# Patient Record
Sex: MALE | Race: BLACK OR AFRICAN AMERICAN | NOT HISPANIC OR LATINO | Employment: PART TIME | URBAN - METROPOLITAN AREA
[De-identification: names, ages, dates, MRNs, and addresses within clinical notes are randomized per-mention and may not be internally consistent; named-entity substitution may affect disease eponyms.]

---

## 2023-09-26 ENCOUNTER — APPOINTMENT (OUTPATIENT)
Dept: RADIOLOGY | Facility: HOSPITAL | Age: 63
End: 2023-09-26
Attending: EMERGENCY MEDICINE
Payer: OTHER MISCELLANEOUS

## 2023-09-26 ENCOUNTER — HOSPITAL ENCOUNTER (EMERGENCY)
Facility: HOSPITAL | Age: 63
Discharge: HOME | End: 2023-09-26
Attending: EMERGENCY MEDICINE | Admitting: EMERGENCY MEDICINE
Payer: OTHER MISCELLANEOUS

## 2023-09-26 VITALS
SYSTOLIC BLOOD PRESSURE: 135 MMHG | WEIGHT: 159.17 LBS | DIASTOLIC BLOOD PRESSURE: 74 MMHG | BODY MASS INDEX: 24.98 KG/M2 | OXYGEN SATURATION: 99 % | TEMPERATURE: 98 F | RESPIRATION RATE: 18 BRPM | HEART RATE: 80 BPM | HEIGHT: 67 IN

## 2023-09-26 DIAGNOSIS — S53.401A SPRAIN OF RIGHT UPPER ARM, INITIAL ENCOUNTER: ICD-10-CM

## 2023-09-26 PROBLEM — M17.12 PRIMARY OSTEOARTHRITIS OF LEFT KNEE: Status: ACTIVE | Noted: 2019-10-03

## 2023-09-26 PROBLEM — N40.1 BPH WITH OBSTRUCTION/LOWER URINARY TRACT SYMPTOMS: Status: ACTIVE | Noted: 2023-07-10

## 2023-09-26 PROBLEM — N13.8 BPH WITH OBSTRUCTION/LOWER URINARY TRACT SYMPTOMS: Status: ACTIVE | Noted: 2023-07-10

## 2023-09-26 PROBLEM — R33.9 RETENTION OF URINE: Status: ACTIVE | Noted: 2021-07-21

## 2023-09-26 PROCEDURE — 73090 X-RAY EXAM OF FOREARM: CPT | Mod: RT

## 2023-09-26 PROCEDURE — 29125 APPL SHORT ARM SPLINT STATIC: CPT | Mod: RT

## 2023-09-26 PROCEDURE — 99283 EMERGENCY DEPT VISIT LOW MDM: CPT

## 2023-09-26 RX ORDER — HYDROCODONE BITARTRATE AND ACETAMINOPHEN 5; 325 MG/1; MG/1
1 TABLET ORAL EVERY 6 HOURS PRN
Qty: 10 TABLET | Refills: 0 | Status: SHIPPED | OUTPATIENT
Start: 2023-09-26 | End: 2023-09-29

## 2023-09-26 ASSESSMENT — ACTIVITIES OF DAILY LIVING (ADL): ADLS_ACUITY_SCORE: 35

## 2023-09-26 NOTE — DISCHARGE INSTRUCTIONS
Shashi is most consistent with a sprain or injury to the muscles and tendons of your forearm.  Recommend rest ice and light duty.  Utilize brace to immobilize the area this will help it heal.  Expect improvement to your symptoms over the next 5 to 7 days.  If you have escalation your symptoms return to emergency department for repeat assessment of your symptoms are persistent and not resolving recommend follow-up with orthopedics on outpatient basis.  Pain medication as prescribed.

## 2023-09-26 NOTE — ED PROVIDER NOTES
"EMERGENCY DEPARTMENT ENCOUNTER      NAME: Javed Bender  AGE: 62 year old male  YOB: 1960  MRN: [id number]  EVALUATION DATE & TIME: No admission date for patient encounter.    PCP: No primary care provider on file.    ED PROVIDER: Ricardo Oneal M.D.       Chief Complaint   Patient presents with    Arm Injury     Right     FINAL IMPRESSION:  1. Sprain of right upper arm, initial encounter      ED COURSE & MEDICAL DECISION MAKING:    Pertinent Labs & Imaging studies reviewed. (See chart for details)  62 year old male presents to the Emergency Department for evaluation of right forearm injury.  Patient works at [employer].  He was lifting a \"70 pound box\".  When he had sudden onset of pain to the volar aspect of his mid right forearm.  Reports pain when he flexes and extends his wrist.  Never had this happen before.  On examination he had tenderness mostly with movement of the wrist.  He did not have point tenderness to the wrist bones or the forearm bones.  His elbow seem to be fine and movement at the elbow did not seem to elicit that same discomfort.  The overall examination was most consistent with a muscular or tendon strain to the right forearm.  I did perform x-ray to evaluate for possible fracture this was negative.  We applied a removable Velcro forearm splint that immobilized the wrist and patient reported that that helped with his symptomatology.  Overall plan of care for discharge rest ice immobilization follow-up if symptoms persist to return if symptoms worsen.  Note that the history discussion of the test results and care plan was conducted utilizing a .         2:31 AM I met with the patient, obtained history, performed an initial exam, and discussed options and plan for diagnostics and treatment here in the ED.    At the conclusion of the encounter I discussed the results of all of the tests and the disposition. The questions were answered. The patient or family " "acknowledged understanding and was agreeable with the care plan.     Medical Decision Making    History:  Supplemental history from: Friend  External Record(s) reviewed: Documented in chart, if applicable.    Work Up:  Chart documentation includes differential considered and any EKGs or imaging independently interpreted by provider, where specified.  In additional to work up documented, I considered the following work up: Documented in chart, if applicable.    External consultation:  Discussion of management with another provider: Documented in chart, if applicable    Complicating factors:  Care impacted by chronic illness: Diabetes  Care affected by social determinants of health: N/A    Disposition considerations: Discharge. I prescribed additional prescription strength medication(s) as charted. See documentation for any additional details.        MEDICATIONS GIVEN IN THE EMERGENCY:  Medications - No data to display    NEW PRESCRIPTIONS STARTED AT TODAY'S ER VISIT  New Prescriptions    HYDROCODONE-ACETAMINOPHEN (NORCO) 5-325 MG TABLET    Take 1 tablet by mouth every 6 hours as needed for severe pain          =================================================================    HPI    Patient information was obtained from: patient     Use of : Yes (video call) Language: NACHO Bender is a 62 year old male with a pertinent history of diabetes who presents to this ED via walk-in for evaluation of arm pain.    The patient presents with right forearm pain after he was lifting a heavy box at his job ([employer] He states that this was a ~70 lb wooden crate that may have had tires in it. He did not feel or hear a \"pop\" but had pain immediately afterwards. He continued to work afterwards and \"walk it off\" but after talking with his boss he iced and wrapped it. Now, he continues to have pain over his right forearm and has pain/difficulty closing his right hand. He also notices some swelling and a feeling " "of \"heaviness\" in his forearm. He does not have pain in his upper arm. He would like to be prescribed pain medications but does not need anything right now. He would like a wrist brace so that he can work tomorrow. He is going to talk to his boss to see if he can perform light work duties. He works tomorrow but has Thursday and Friday off. He denies any other complaints at this time.       REVIEW OF SYSTEMS   Review of Systems   Musculoskeletal:         Pain to right forearm   All other systems reviewed and are negative.       PAST MEDICAL HISTORY:  History reviewed. No pertinent past medical history.    PAST SURGICAL HISTORY:  History reviewed. No pertinent surgical history.        CURRENT MEDICATIONS:    HYDROcodone-acetaminophen (NORCO) 5-325 MG tablet        ALLERGIES:  No Known Allergies    FAMILY HISTORY:  History reviewed. No pertinent family history.    SOCIAL HISTORY:        VITALS:  BP (!) 147/78   Pulse 83   Temp 98  F (36.7  C) (Oral)   Resp 18   Ht 1.702 m (5' 7\")   Wt 72.2 kg (159 lb 2.8 oz)   SpO2 98%   BMI 24.93 kg/m      PHYSICAL EXAM    PHYSICAL EXAM    VITAL SIGNS: BP (!) 147/78   Pulse 83   Temp 98  F (36.7  C) (Oral)   Resp 18   Ht 1.702 m (5' 7\")   Wt 72.2 kg (159 lb 2.8 oz)   SpO2 98%   BMI 24.93 kg/m    Constitutional:  Well developed, well nourished  EYES: Conjunctivae clear, no discharge  HENT: Atraumatic, normocephalic, bilateral external ears normal.  Oropharynx moist. Nose normal.   Neck: Normal ROM , Supple   Respiratory:  No respiratory distress, normal nonlabored respirations.   Cardiovascular:  Distal perfusion appears intact  Musculoskeletal: Tenderness to movement of the wrist flexion extension localizing the pain to the volar aspect of the midforearm.  No appreciable soft tissue swelling no bony tenderness  Integument:  Warm, Dry, No erythema, No rash.   Neurologic:  Alert and oriented. No focal deficits noted.  Ambulatory  Psychiatric:  Affect normal, Judgment " normal, Mood normal.       LAB:  All pertinent labs reviewed and interpreted.  Results for orders placed or performed during the hospital encounter of 09/26/23   Radius/Ulna XR, PA & LAT, right    Impression    IMPRESSION: No visible fracture or dislocation.       RADIOLOGY:  Reviewed all pertinent imaging. Please see official radiology report.  Radius/Ulna XR, PA & LAT, right   Final Result   IMPRESSION: No visible fracture or dislocation.          I, Cruzito Mcgee, am serving as a scribe to document services personally performed by Ricardo Oneal M.D. based on my observation and the provider's statements to me. I, Ricardo Oneal M.D., attest that Cruzito Mcgee is acting in a scribe capacity, has observed my performance of the services and has documented them in accordance with my direction.    Ricardo Oneal M.D.  [facility] EMERGENCY DEPARTMENT  [address] [place] [id number]  [phone]       Ricardo Oneal MD  09/26/23 0333

## 2023-09-26 NOTE — ED TRIAGE NOTES
Pt is deaf--uses American Sign Language. Pt lifted a heavy box at his job around 9 pm this past bev and started having pain. Pt works at [employer] and has paperwork which needs to be filled out.